# Patient Record
Sex: MALE | Race: WHITE | NOT HISPANIC OR LATINO
[De-identification: names, ages, dates, MRNs, and addresses within clinical notes are randomized per-mention and may not be internally consistent; named-entity substitution may affect disease eponyms.]

---

## 2019-01-24 ENCOUNTER — APPOINTMENT (OUTPATIENT)
Age: 64
End: 2019-01-24

## 2019-01-24 ENCOUNTER — OUTPATIENT (OUTPATIENT)
Dept: OUTPATIENT SERVICES | Facility: HOSPITAL | Age: 64
LOS: 1 days | End: 2019-01-24
Payer: MEDICAID

## 2019-01-24 VITALS
DIASTOLIC BLOOD PRESSURE: 79 MMHG | TEMPERATURE: 98.2 F | WEIGHT: 299 LBS | RESPIRATION RATE: 18 BRPM | BODY MASS INDEX: 37.18 KG/M2 | HEART RATE: 86 BPM | HEIGHT: 75 IN | OXYGEN SATURATION: 99 % | SYSTOLIC BLOOD PRESSURE: 141 MMHG

## 2019-01-24 DIAGNOSIS — Z00.00 ENCOUNTER FOR GENERAL ADULT MEDICAL EXAMINATION WITHOUT ABNORMAL FINDINGS: ICD-10-CM

## 2019-01-24 PROCEDURE — G0463: CPT

## 2019-01-24 NOTE — HISTORY OF PRESENT ILLNESS
[FreeTextEntry1] : CC: 64 y/o male presents to clinic for follow up of right fourth digit partial amputation\par \par S:\par HPI: 64 y/o male with PMH of venous insufficiency and DM presents to clinic s/p right fourh digit partial amputation done at [facility] two weeks ago. Pt states he was discharged after the procedure on Levaquin for ten days. Pt states he completed the course of antibiotics. Pt denies any current pain at the surgical site. Pt denies any current F/V/N/C/SOB. Pt states he has been changing his dressings daily and keeping them clean, dry and intact

## 2019-01-24 NOTE — ASSESSMENT
[FreeTextEntry1] : O: \par Vascular: DP/PT 2/4 b/l; CFT <3 sec x 9; TG WNL; edema and erythema noted to the anterior leg b/l\par Derm: Surgical site - right fourth digit partial amputation with sutures intact - no noted dehiscence and are well coapted; no clinical signs of infection \par Neuro - Protective sensation diminished at the level of the digits\par \par O:\par s/p right fourth digit partial amp\par Chronic venous insufficiency\par \par P:\par Pt evaluated and chart created\par Sutures removed from surgical site using a sterile 15 blade and hemostat\par Pt tolerated procedure well with no complications\par Area dressed with Bacitracin, 4x4 gauze, Arianna - instructed patient to change dressing daily and keep it clean dry and intact\par Referral made for Vascular for work up of chronic venous insuffiency\par Pt to RTC 2 weeks for f/u of s/p right 4th digit amp

## 2019-01-28 DIAGNOSIS — I87.2 VENOUS INSUFFICIENCY (CHRONIC) (PERIPHERAL): ICD-10-CM

## 2019-02-07 ENCOUNTER — APPOINTMENT (OUTPATIENT)
Dept: PODIATRY | Facility: CLINIC | Age: 64
End: 2019-02-07